# Patient Record
Sex: MALE | Race: WHITE
[De-identification: names, ages, dates, MRNs, and addresses within clinical notes are randomized per-mention and may not be internally consistent; named-entity substitution may affect disease eponyms.]

---

## 2017-10-12 NOTE — NUR
PATIENT IS A 61 YO MALE BIB [facility] FOR PREBOOK EXAM. PATIENT IS AWAKE AND 
ALERT ABLE TO AMBULATE. C/C IS HIGH BLOOD PRESSURE. SEEN BY DR. BRANNON IN 
OVERFLOW. TAKES BP MEDS BUT DOESN'T REMEMBER THE NAMES. PRIMARY CARE MD IS AT 
[facility] OFFICE.

## 2017-10-12 NOTE — NUR
Patient discharged with v/s stable. Written and verbal after care instructions 
given and explained. 

Patient verbalized understanding. Police with in custody. All questions 
addressed prior to discharge. Advised to follow up with PMD.

## 2018-02-23 ENCOUNTER — HOSPITAL ENCOUNTER (INPATIENT)
Dept: HOSPITAL 26 - MED | Age: 63
LOS: 2 days | Discharge: HOME | DRG: 304 | End: 2018-02-25
Payer: SELF-PAY

## 2018-02-23 VITALS — BODY MASS INDEX: 26.48 KG/M2 | HEIGHT: 70 IN | WEIGHT: 185 LBS

## 2018-02-23 VITALS — SYSTOLIC BLOOD PRESSURE: 149 MMHG | DIASTOLIC BLOOD PRESSURE: 97 MMHG

## 2018-02-23 VITALS — DIASTOLIC BLOOD PRESSURE: 106 MMHG | SYSTOLIC BLOOD PRESSURE: 192 MMHG

## 2018-02-23 VITALS — SYSTOLIC BLOOD PRESSURE: 157 MMHG | DIASTOLIC BLOOD PRESSURE: 93 MMHG

## 2018-02-23 DIAGNOSIS — Z91.19: ICD-10-CM

## 2018-02-23 DIAGNOSIS — G31.89: ICD-10-CM

## 2018-02-23 DIAGNOSIS — I16.0: Primary | ICD-10-CM
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
## 2018-02-23 DIAGNOSIS — N28.1: ICD-10-CM

## 2018-02-23 DIAGNOSIS — E78.5: ICD-10-CM

## 2018-02-23 DIAGNOSIS — F19.10: ICD-10-CM

## 2018-02-23 DIAGNOSIS — I67.4: ICD-10-CM
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
## 2018-02-23 DIAGNOSIS — N17.0: ICD-10-CM

## 2018-02-23 DIAGNOSIS — Z59.0: ICD-10-CM
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
## 2018-02-23 DIAGNOSIS — S83.92XA: ICD-10-CM
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
## 2018-02-23 DIAGNOSIS — F15.10: ICD-10-CM

## 2018-02-23 DIAGNOSIS — I10: ICD-10-CM

## 2018-02-23 DIAGNOSIS — G92: ICD-10-CM

## 2018-02-23 DIAGNOSIS — E44.1: ICD-10-CM

## 2018-02-23 DIAGNOSIS — G96.9: ICD-10-CM

## 2018-02-23 LAB
ALBUMIN FLD-MCNC: 3.3 G/DL (ref 3.4–5)
AMYLASE SERPL-CCNC: 50 U/L (ref 25–115)
ANION GAP SERPL CALCULATED.3IONS-SCNC: 11.7 MMOL/L (ref 8–16)
APAP SERPL-MCNC: < 0.5 UG/ML (ref 10–30)
APPEARANCE UR: CLEAR
AST SERPL-CCNC: 15 U/L (ref 15–37)
BARBITURATES UR QL SCN: (no result) NG/ML
BASOPHILS # BLD AUTO: 0.1 K/UL (ref 0–0.22)
BASOPHILS NFR BLD AUTO: 1.2 % (ref 0–2)
BENZODIAZ UR QL SCN: (no result) NG/ML
BILIRUB SERPL-MCNC: 0.2 MG/DL (ref 0–1)
BILIRUB UR QL STRIP: NEGATIVE
BUN SERPL-MCNC: 21 MG/DL (ref 7–18)
BZE UR QL SCN: (no result) NG/ML
CANNABINOIDS UR QL SCN: (no result) NG/ML
CHLORIDE SERPL-SCNC: 101 MMOL/L (ref 98–107)
CHOLEST/HDLC SERPL: 3.5 {RATIO} (ref 1–4.5)
CO2 SERPL-SCNC: 31.6 MMOL/L (ref 21–32)
COLOR UR: YELLOW
CREAT SERPL-MCNC: 1.1 MG/DL (ref 0.7–1.3)
EOSINOPHIL # BLD AUTO: 0 K/UL (ref 0–0.4)
EOSINOPHIL NFR BLD AUTO: 0.4 % (ref 0–4)
ERYTHROCYTE [DISTWIDTH] IN BLOOD BY AUTOMATED COUNT: 15.7 % (ref 11.6–13.7)
GFR SERPL CREATININE-BSD FRML MDRD: 87 ML/MIN (ref 90–?)
GLUCOSE SERPL-MCNC: 162 MG/DL (ref 74–106)
GLUCOSE UR STRIP-MCNC: (no result) MG/DL
HCT VFR BLD AUTO: 48.6 % (ref 36–52)
HDLC SERPL-MCNC: 64 MG/DL (ref 40–60)
HGB BLD-MCNC: 15.2 G/DL (ref 12–18)
HGB UR QL STRIP: NEGATIVE
LDLC SERPL CALC-MCNC: 139 MG/DL (ref 60–100)
LEUKOCYTE ESTERASE UR QL STRIP: NEGATIVE
LIPASE SERPL-CCNC: 99 U/L (ref 73–393)
LYMPHOCYTES # BLD AUTO: 0.5 K/UL (ref 2–11.5)
LYMPHOCYTES NFR BLD AUTO: 6 % (ref 20.5–51.1)
MAGNESIUM SERPL-MCNC: 2 MG/DL (ref 1.8–2.4)
MCH RBC QN AUTO: 28 PG (ref 27–31)
MCHC RBC AUTO-ENTMCNC: 31 G/DL (ref 33–37)
MCV RBC AUTO: 90 FL (ref 80–94)
MONOCYTES # BLD AUTO: 0.5 K/UL (ref 0.8–1)
MONOCYTES NFR BLD AUTO: 5.2 % (ref 1.7–9.3)
NEUTROPHILS # BLD AUTO: 7.9 K/UL (ref 1.8–7.7)
NEUTROPHILS NFR BLD AUTO: 87.2 % (ref 42.2–75.2)
NITRITE UR QL STRIP: NEGATIVE
OPIATES UR QL SCN: (no result) NG/ML
PCP UR QL SCN: (no result) NG/ML
PH UR STRIP: 6 [PH] (ref 5–9)
PHOSPHATE SERPL-MCNC: 2.6 MG/DL (ref 2.5–4.9)
PLATELET # BLD AUTO: 296 K/UL (ref 140–450)
POTASSIUM SERPL-SCNC: 3.3 MMOL/L (ref 3.5–5.1)
PROTHROMBIN TIME: 10.5 SECS (ref 10.8–13.4)
RBC # BLD AUTO: 5.41 MIL/UL (ref 4.2–6.1)
SALICYLATES SERPL-MCNC: 3 MG/DL (ref 2.8–20)
SODIUM SERPL-SCNC: 141 MMOL/L (ref 136–145)
T4 FREE SERPL-MCNC: 0.8 NG/DL (ref 0.76–1.46)
TRIGL SERPL-MCNC: 100 MG/DL (ref 30–150)
TSH SERPL DL<=0.05 MIU/L-ACNC: 1.6 UIU/ML (ref 0.34–3.74)
WBC # BLD AUTO: 9 K/UL (ref 4.8–10.8)

## 2018-02-23 PROCEDURE — G0480 DRUG TEST DEF 1-7 CLASSES: HCPCS

## 2018-02-23 PROCEDURE — G0482 DRUG TEST DEF 15-21 CLASSES: HCPCS

## 2018-02-23 RX ADMIN — SODIUM CHLORIDE SCH MLS/HR: 9 INJECTION, SOLUTION INTRAVENOUS at 18:27

## 2018-02-23 SDOH — ECONOMIC STABILITY - HOUSING INSECURITY: HOMELESSNESS: Z59.0

## 2018-02-23 NOTE — NUR
ADMINISTERED ORDERED K-DUR PO WITH EDUCATION, PT VERBALIZED UNDERSTANDING, TOLERATED WELL. 
US RENAL AND US CAROTID ALREADY PERFORMED BY [email or web address], PT'S DIET IS NOW CARDIAC DIET, 
PROVIDED PT WITH SANDWICH AND SNACKS. 

ALL NEEDS MET. IVF INFUSING WELL. SAFETY MEASURES ENSURED. CALL LIGHT WITHIN REACH.

## 2018-02-23 NOTE — NUR
PATIENT LYING DOWN IN BED SLEEPING, AROUSABLE BY VOICE. DENIES ANY PAIN. SCHEDULED 
MEDICATIONS DUE GIVEN. COMPLAINS OF DIZZINESS. BLOOD PRESSURE STILL ELEVATED. SAFETY 
MEASURES IN PLACE, CALL LIGHT WITHIN REACH. WILL CONTINUE TO MONITOR.

## 2018-02-23 NOTE — NUR
RADIOLOGIST AT BEDSIDE TO DO A KIDNEY ULTRASOUND SCAN CALLED VIA PHONE. PATIENT REFUSED AT 
THIS TIME STATING "I WANT TO JUST REST AND EAT FOR NOW". RADIOLOGIST SUGGEST KEEPING PATIENT 
NPO AT MIDNIGHT AND SHE WILL BE BACK IN THE MORNING TO DO THE SCANS. WILL CONTINUE TO 
MONITOR.

## 2018-02-23 NOTE — NUR
Patient appears to be resting comfortably in bed. Warm blankets provided. Pt 
placed in position of comfort, pillow provided. All comfort needs met. All 
belongings placed in a belongings bag and set behind the head of gurney and 
patient was made aware as well. Pt verbalized understanding.

## 2018-02-23 NOTE — NUR
RECEIVED REPORT FROM AM NURSE. PT RESTING IN BED, AOX4, ABLE TO VERBALIZE NEEDS. CARDIAC 
MONITOR IN PLACE. PT DENIES CP, SOB, DIZZINESS AT THIS TIME. SPO2 100% ON ROOM AIR, RR 20 
EVEN AND UNLABORED. IV ACCESS ASYMPTOMATIC, PATENT AND INTACT. DISCUSSED AND REVIEWED PLAN 
OF CARE WITH PT. PT AGREED TO HAVE US KIDNEY AND US CAROTID, WILL NOTIFY US TECH. INSTRUCTED 
PT TO COLLECT URINE SAMPLE, PT VERBALIZED UNDERSTANDING, WILL SEND TO LAB WHEN COLLECTED. 
ALL NEEDS MET. SAFETY MEASURES ENSURED. CALL LIGHT WITHIN REACH.

## 2018-02-23 NOTE — NUR
PATIENT ARRIVED ON MST UNIT FROM ER BED/RNEY. ABLE TO AMBULATE FROM ER BED TO MST BED WITH 
STEADY GAIT. NO DISTRESS NOTED. RESPIRATIONS, EVEN, UNLABORED, ON ROOM AIR. DENIES ANY PAIN 
AT THIS TIME. AAOX4, CALM, COOPERATIVE, SKIN COLOR APPROPRIATE TO ETHNICITY, WARM TO TOUCH. 
SKIN IS INTACT. IV SITE IS INTACT, PATENT, AND CONNECTED PATIENT TO IVF AS PER ORDERS. LUNGS 
CTA ON ALL LOBES. ABDOMEN SOFT, NON-DISTENDED. ORIENTED PATIENT TO ROOM AND CALL LIGHT. 
REVIEWED PLAN OF CARE WITH PATIENT. PATIENT VERBALIZED UNDERSTANDING. SAFETY MEASURES IN 
PLACE, CALL LIGHT WITHIN REACH. WILL CONTINUE TO MONITOR.

## 2018-02-23 NOTE — NUR
62/M biba found by the [facility] in [place] for evaluation of generalized 
weakness x2 days. AOX4, clear speech. Pt noted with N/V; vomiting x1 in ED. IV 
established by EMS to left ac 18 gauge. Pt received 4mg Zofran IVP prior to 
arrival by EMS. Hx HTN.

## 2018-02-24 VITALS — SYSTOLIC BLOOD PRESSURE: 162 MMHG | DIASTOLIC BLOOD PRESSURE: 105 MMHG

## 2018-02-24 VITALS — SYSTOLIC BLOOD PRESSURE: 169 MMHG | DIASTOLIC BLOOD PRESSURE: 104 MMHG

## 2018-02-24 VITALS — DIASTOLIC BLOOD PRESSURE: 97 MMHG | SYSTOLIC BLOOD PRESSURE: 147 MMHG

## 2018-02-24 VITALS — SYSTOLIC BLOOD PRESSURE: 159 MMHG | DIASTOLIC BLOOD PRESSURE: 93 MMHG

## 2018-02-24 VITALS — SYSTOLIC BLOOD PRESSURE: 164 MMHG | DIASTOLIC BLOOD PRESSURE: 108 MMHG

## 2018-02-24 VITALS — DIASTOLIC BLOOD PRESSURE: 93 MMHG | SYSTOLIC BLOOD PRESSURE: 152 MMHG

## 2018-02-24 VITALS — DIASTOLIC BLOOD PRESSURE: 105 MMHG | SYSTOLIC BLOOD PRESSURE: 175 MMHG

## 2018-02-24 LAB
ANION GAP SERPL CALCULATED.3IONS-SCNC: 8.8 MMOL/L (ref 8–16)
BASOPHILS # BLD AUTO: 0.2 K/UL (ref 0–0.22)
BASOPHILS NFR BLD AUTO: 1.9 % (ref 0–2)
BUN SERPL-MCNC: 15 MG/DL (ref 7–18)
CHLORIDE SERPL-SCNC: 101 MMOL/L (ref 98–107)
CO2 SERPL-SCNC: 33.4 MMOL/L (ref 21–32)
CREAT SERPL-MCNC: 1 MG/DL (ref 0.7–1.3)
EOSINOPHIL # BLD AUTO: 0.1 K/UL (ref 0–0.4)
EOSINOPHIL NFR BLD AUTO: 0.6 % (ref 0–4)
ERYTHROCYTE [DISTWIDTH] IN BLOOD BY AUTOMATED COUNT: 16.1 % (ref 11.6–13.7)
GFR SERPL CREATININE-BSD FRML MDRD: 97 ML/MIN (ref 90–?)
GLUCOSE SERPL-MCNC: 136 MG/DL (ref 74–106)
HCT VFR BLD AUTO: 47.3 % (ref 36–52)
HGB BLD-MCNC: 15 G/DL (ref 12–18)
LYMPHOCYTES # BLD AUTO: 1 K/UL (ref 2–11.5)
LYMPHOCYTES NFR BLD AUTO: 9 % (ref 20.5–51.1)
MAGNESIUM SERPL-MCNC: 2.1 MG/DL (ref 1.8–2.4)
MCH RBC QN AUTO: 28 PG (ref 27–31)
MCHC RBC AUTO-ENTMCNC: 32 G/DL (ref 33–37)
MCV RBC AUTO: 90 FL (ref 80–94)
MONOCYTES # BLD AUTO: 1.2 K/UL (ref 0.8–1)
MONOCYTES NFR BLD AUTO: 11.1 % (ref 1.7–9.3)
NEUTROPHILS # BLD AUTO: 8.8 K/UL (ref 1.8–7.7)
NEUTROPHILS NFR BLD AUTO: 77.4 % (ref 42.2–75.2)
PHOSPHATE SERPL-MCNC: 2.7 MG/DL (ref 2.5–4.9)
PLATELET # BLD AUTO: 332 K/UL (ref 140–450)
POTASSIUM SERPL-SCNC: 4.2 MMOL/L (ref 3.5–5.1)
RBC # BLD AUTO: 5.28 MIL/UL (ref 4.2–6.1)
SODIUM SERPL-SCNC: 139 MMOL/L (ref 136–145)
T3RU NFR SERPL: 27 % (ref 24–39)
T4 SERPL-MCNC: 5.8 UG/DL (ref 4.5–12)
WBC # BLD AUTO: 11.3 K/UL (ref 4.8–10.8)

## 2018-02-24 RX ADMIN — LISINOPRIL SCH MG: 20 TABLET ORAL at 20:42

## 2018-02-24 RX ADMIN — SODIUM CHLORIDE SCH MLS/HR: 9 INJECTION, SOLUTION INTRAVENOUS at 11:11

## 2018-02-24 RX ADMIN — LISINOPRIL SCH MG: 20 TABLET ORAL at 20:46

## 2018-02-24 RX ADMIN — SODIUM CHLORIDE SCH MLS/HR: 9 INJECTION, SOLUTION INTRAVENOUS at 04:50

## 2018-02-24 NOTE — NUR
PT SLEEPING COMFORTABLY, AROUSABLE TO NAME, SPO2 96% ON ROOM AIR, RR 18 EVEN AND UNLABORED. 
ALL NEEDS MET. IVF INFUSING WELL. SAFETY MEASURES ENSURED. CALL LIGHT WITHIN REACH.

## 2018-02-24 NOTE — NUR
RECEIVED REPORT FROM NIGHT SHIFT NURSE. PT IS SLEEPING IN BED BUT EASILY AWAKEN, PT IS 
AAOX4, AMBULATORY, PT HAS IV ON HIS LEFT AC, PATENT, INTACT, FLUSHING WELL, NO S/S OF 
RESPIRATORY DISTRESS OR DISCOMFORT NOTED, PT IS ON ROOM AIR AT THIS TIME, SKIN IS INTACT, NO 
S/S  OF RESPIRATORY DISTRESS OR DISCOMFORT NOTED, DISCUSSED PLAN OF CARE WITH PT, PT 
VERBALIZED UNDERSTANDING, CALL LIGHT IS WITHIN REACH, WILL CONTINUE TO MONITOR.

## 2018-02-24 NOTE — NUR
RECEIVED REPORT FROM DAY SHIFT NURSE. AAOX4. IV TO LEFT AC #18G, NS AT 80 ML/HR. NO DISTRESS 
NOTED. PT ON ROOM AIR. DISCUSSED PLAN OF CARE, PT VERBALIZED UNDERSTANDING. SAFETY 
PRECAUTION IN PLACE. CALL LIGHT WITHIN REACH. WILL CONTINUE TO MONITOR.

## 2018-02-24 NOTE — NUR
ADMINISTERED LISINOPRIL 20MG PO WITH EDUCATION. PT VERBALIZED UNDERSTANDING, TOLERATED MED 
WELL.

ALL NEEDS MET. IVF INFUSING WELL. SAFETY MEASURES ENSURED. CALL LIGHT WITHIN REACH.

## 2018-02-24 NOTE — NUR
DR. DELONG MADE AWARE OF PT'S ELEVATED BLOOD PRESSURE. MD ORDERED LISINOPRIL 20 MG TAB PO. 
ORDER NOTED AND WILL CARRY OUT.

## 2018-02-24 NOTE — NUR
PT SLEEPING IN BED AT THIS TIME, NO S/S OF RESPIRATORY DISTRESS OR DISCOMFORT NOTED, CALL 
LIGHT IS WITHIN REACH.

## 2018-02-24 NOTE — NUR
SPOKE WITH DR BAUGH, MADE MD AWARE OF ELEVATED BP AT THIS TIME 162/105, . MD TO 
ORDER LISINOPRIL PO, ORDERS PENDING, WILL CARRY OUT. 

-------------------------------------------------------------------------------

Addendum: 02/24/18 at 0646 by Virgilio Biggs RN

-------------------------------------------------------------------------------

MADE MD AWARE OF K 3.3 ON ADMISSION AND WAS REPLACED WITH 40 MEQ K-OLY IN THE EVENING, MD 
AWARE.

## 2018-02-25 VITALS — SYSTOLIC BLOOD PRESSURE: 152 MMHG | DIASTOLIC BLOOD PRESSURE: 100 MMHG

## 2018-02-25 VITALS — DIASTOLIC BLOOD PRESSURE: 91 MMHG | SYSTOLIC BLOOD PRESSURE: 146 MMHG

## 2018-02-25 VITALS — SYSTOLIC BLOOD PRESSURE: 170 MMHG | DIASTOLIC BLOOD PRESSURE: 105 MMHG

## 2018-02-25 VITALS — SYSTOLIC BLOOD PRESSURE: 170 MMHG | DIASTOLIC BLOOD PRESSURE: 117 MMHG

## 2018-02-25 LAB
ANION GAP SERPL CALCULATED.3IONS-SCNC: 12.2 MMOL/L (ref 8–16)
BASOPHILS # BLD AUTO: 0.3 K/UL (ref 0–0.22)
BASOPHILS NFR BLD AUTO: 2.1 % (ref 0–2)
BUN SERPL-MCNC: 13 MG/DL (ref 7–18)
CHLORIDE SERPL-SCNC: 99 MMOL/L (ref 98–107)
CO2 SERPL-SCNC: 30.6 MMOL/L (ref 21–32)
CREAT SERPL-MCNC: 0.9 MG/DL (ref 0.7–1.3)
EOSINOPHIL # BLD AUTO: 0.2 K/UL (ref 0–0.4)
EOSINOPHIL NFR BLD AUTO: 1.3 % (ref 0–4)
ERYTHROCYTE [DISTWIDTH] IN BLOOD BY AUTOMATED COUNT: 16 % (ref 11.6–13.7)
GFR SERPL CREATININE-BSD FRML MDRD: 110 ML/MIN (ref 90–?)
GLUCOSE SERPL-MCNC: 114 MG/DL (ref 74–106)
HCT VFR BLD AUTO: 50.4 % (ref 36–52)
HGB BLD-MCNC: 16.1 G/DL (ref 12–18)
LYMPHOCYTES # BLD AUTO: 1.7 K/UL (ref 2–11.5)
LYMPHOCYTES NFR BLD AUTO: 12 % (ref 20.5–51.1)
MAGNESIUM SERPL-MCNC: 2 MG/DL (ref 1.8–2.4)
MCH RBC QN AUTO: 29 PG (ref 27–31)
MCHC RBC AUTO-ENTMCNC: 32 G/DL (ref 33–37)
MCV RBC AUTO: 90 FL (ref 80–94)
MONOCYTES # BLD AUTO: 1.4 K/UL (ref 0.8–1)
MONOCYTES NFR BLD AUTO: 10 % (ref 1.7–9.3)
NEUTROPHILS # BLD AUTO: 10.4 K/UL (ref 1.8–7.7)
NEUTROPHILS NFR BLD AUTO: 74.6 % (ref 42.2–75.2)
PHOSPHATE SERPL-MCNC: 2.8 MG/DL (ref 2.5–4.9)
PLATELET # BLD AUTO: 348 K/UL (ref 140–450)
POTASSIUM SERPL-SCNC: 3.8 MMOL/L (ref 3.5–5.1)
RBC # BLD AUTO: 5.58 MIL/UL (ref 4.2–6.1)
SODIUM SERPL-SCNC: 138 MMOL/L (ref 136–145)
WBC # BLD AUTO: 14 K/UL (ref 4.8–10.8)

## 2018-02-25 RX ADMIN — LISINOPRIL SCH MG: 20 TABLET ORAL at 08:07

## 2018-02-25 RX ADMIN — SODIUM CHLORIDE SCH MLS/HR: 9 INJECTION, SOLUTION INTRAVENOUS at 05:51

## 2018-02-25 NOTE — NUR
RECHECKED V/S /104, . HYDRALAZINE 5 MG IVP ADMINISTERED AS ORDERED. NO DISTRESS 
NOTED. CALL LIGHT WITHIN REACH.

## 2018-02-25 NOTE — NUR
PT REFUSED BP MEDS. EXPLAINED TO PT THE IMPORTANCE OF MEDS BUT STILL REFUSED. WILL COME BACK 
LATER TO RECHECK

V/S.

## 2018-02-25 NOTE — NUR
DISCHARGE INSTRUCTIONS GIVEN, PATIENT WAS PROVIDED WITH A BUS PASS AND HOMELESS SHELTER 
RESOURCES. PT SAID HE WOULD BE GOING TO HIS FRIENDS HOUSE ON [address]. IV REMOVED, 
CATHETER TIP INTACT.

## 2018-02-25 NOTE — NUR
PT SITTING UP IN BED EATING LUNCH, NO S/S OF RESPIRATORY DISTRESS OR DISCOMFORT NOTED, CALL 
LIGHT IS WITHIN REACH.